# Patient Record
Sex: MALE | Race: WHITE
[De-identification: names, ages, dates, MRNs, and addresses within clinical notes are randomized per-mention and may not be internally consistent; named-entity substitution may affect disease eponyms.]

---

## 2017-02-08 ENCOUNTER — HOSPITAL ENCOUNTER (EMERGENCY)
Dept: HOSPITAL 25 - UCCORT | Age: 62
Discharge: LEFT BEFORE DISCHARGE | End: 2017-02-08
Payer: COMMERCIAL

## 2017-02-08 DIAGNOSIS — R05: Primary | ICD-10-CM

## 2017-02-08 DIAGNOSIS — Z53.21: ICD-10-CM

## 2017-02-08 PROCEDURE — 99211 OFF/OP EST MAY X REQ PHY/QHP: CPT

## 2017-02-08 PROCEDURE — G0463 HOSPITAL OUTPT CLINIC VISIT: HCPCS

## 2017-02-09 ENCOUNTER — HOSPITAL ENCOUNTER (EMERGENCY)
Dept: HOSPITAL 25 - UCCORT | Age: 62
Discharge: HOME | End: 2017-02-09
Payer: COMMERCIAL

## 2017-02-09 VITALS — DIASTOLIC BLOOD PRESSURE: 81 MMHG | SYSTOLIC BLOOD PRESSURE: 132 MMHG

## 2017-02-09 DIAGNOSIS — J44.1: Primary | ICD-10-CM

## 2017-02-09 DIAGNOSIS — Z87.891: ICD-10-CM

## 2017-02-09 PROCEDURE — G0463 HOSPITAL OUTPT CLINIC VISIT: HCPCS

## 2017-02-09 PROCEDURE — 99212 OFFICE O/P EST SF 10 MIN: CPT

## 2017-02-09 NOTE — UC
Throat Pain/Nasal Ranjit HPI





- HPI Summary


HPI Summary: 





complaint of cough , nasal congestion that started 6 days ago


 cough with some white sputum occasionally


entire body and muscles aches


 N/V on the first day of illness which has resolved


sometimes his ears are painful, sore throat, 


denies headaches, 


denies fever abut has had some chills


taking alkaseptzer and mucinex and cough dropswithout relief





- History of Current Complaint


Chief Complaint: UCRespiratory


Stated Complaint: CONGESTION/COUGH


Time Seen by Provider: 02/09/17 10:20


Hx Obtained From: Patient





- Allergies/Home Medications


Allergies/Adverse Reactions: 


 Allergies











Allergy/AdvReac Type Severity Reaction Status Date / Time


 


No Known Allergies Allergy   Verified 02/09/17 09:27











Home Medications: 


 Home Medications





Vitamin C-Cholecalciferol-Deloris [Vitamin C & D3/Deloris Hips] 1 cap PO DAILY 02/09/ 17 [History Confirmed 02/09/17]











PMH/Surg Hx/FS Hx/Imm Hx


Previously Healthy: Yes


Cardiovascular History Of: Reports: Cardiac Disorders - CAD, Hypertension


Respiratory History Of: Reports: COPD





- Surgical History


Surgical History: Yes


Surgery Procedure, Year, and Place: R TKR--8/2016.  BACK SX.  APPY.  CARDIAC 

STENT





- Family History


Known Family History: Positive: Cardiac Disease, Hypertension


   Negative: Diabetes





- Social History


Occupation: Employed Full-time


Lives: With Family


Alcohol Use: Weekly


Substance Use Type: None


Smoking Status (MU): Former Smoker


Have You Smoked in the Last Year: No


When Did the Patient Quit Smoking/Using Tobacco: 2006





- Immunization History


Most Recent Influenza Vaccination: Not the 2016/2017 Season





Review of Systems


Constitutional: Fever, Chills, Fatigue


Skin: Negative


Eyes: Negative


ENT: Sore Throat, Nasal Discharge


Respiratory: Cough


Cardiovascular: Negative


Gastrointestinal: Negative


Genitourinary: Negative


Motor: Negative


Neurovascular: Negative


Musculoskeletal: Myalgia


Neurological: Negative


Psychological: Negative


All Other Systems Reviewed And Are Negative: Yes





Physical Exam


Triage Information Reviewed: Yes


Appearance: No Pain Distress, Well-Nourished


Vital Signs: 


 Initial Vital Signs











Temp  97.6 F   02/09/17 09:25


 


Pulse  92   02/09/17 09:25


 


Resp  18   02/09/17 09:25


 


BP  132/81   02/09/17 09:25


 


Pulse Ox  96   02/09/17 09:25











Vital Signs Reviewed: Yes


Eyes: Positive: Conjunctiva Clear


ENT: Positive: Pharyngeal erythema, Nasal congestion, Nasal drainage, TMs normal


Neck: Positive: No Lymphadenopathy


Respiratory: Positive: No respiratory distress, No accessory muscle use, 

Wheezing - wheezing throughout ll fields


Cardiovascular: Positive: RRR, No Murmur, Pulses Normal


Abdomen Description: Positive: Nontender, Soft


Bowel Sounds: Positive: Present


Musculoskeletal: Positive: No Edema


Neurological: Positive: Alert


Psychological Exam: Normal


Skin Exam: Normal





Re-Evaluation





- Re-Evaluation


  ** First Eval


Re-Evaluation Time: 10:55


Change: Improved


Comment: less wheezing and air movement





Throat Pain/Nasal Course/Dx





- Course


Course Of Treatment: exam completed.  duoneb treatment given d/t wheezing 

throughout.  will treat for COPD exacerabation following viral illness/most 

likely influenza- followup with PCP





- Differential Dx/Diagnosis


Differential Diagnosis/HQI/PQRI: Influenza, URI, Other - COPD excerbation


Provider Diagnoses: COPD excerbation





Discharge





- Discharge Plan


Condition: Stable


Disposition: HOME


Prescriptions: 


Albuterol HFA INHALER* [Ventolin HFA Inhaler*] 2 puff INH Q4H PRN #1 mdi


 PRN Reason: Wheezing


Azithromycin TAB* [Zithromax TAB (Z-KATHRYN) 250 mg #6 tabs] 2 tab PO .TODAY, THEN 

1 DAILY #1 kathryn


predniSONE TAB* [Deltasone TAB*] 50 mg PO DAILY #5 tab


Patient Education Materials:  COPD (Chronic Obstructive Pulmonary Disease) (ED)


Forms:  *Work Release


Referrals: 


Tresa Eduardo PA [Primary Care Provider] - 


Additional Instructions: 


Please take antibiotic as directed


Use your albuterol inhaler every 4-6 hours when needed for wheezing, shortness 

of breath or uncontrolled coughing.


 Increase fluids and rest


Take acetaminophen or ibuprofen for fever or pain


Please review your discharge instructions. 


If your symptoms do not improve please call your primary care provider or 

return to urgent care.

## 2018-08-26 ENCOUNTER — HOSPITAL ENCOUNTER (EMERGENCY)
Dept: HOSPITAL 25 - UCCORT | Age: 63
Discharge: HOME | End: 2018-08-26
Payer: COMMERCIAL

## 2018-08-26 VITALS — SYSTOLIC BLOOD PRESSURE: 136 MMHG | DIASTOLIC BLOOD PRESSURE: 79 MMHG

## 2018-08-26 DIAGNOSIS — S61.213A: Primary | ICD-10-CM
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
## 2018-08-26 DIAGNOSIS — I48.91: ICD-10-CM
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
## 2018-08-26 DIAGNOSIS — Z87.891: ICD-10-CM

## 2018-08-26 DIAGNOSIS — Z79.82: ICD-10-CM

## 2018-08-26 PROCEDURE — 99212 OFFICE O/P EST SF 10 MIN: CPT

## 2018-08-26 PROCEDURE — 12001 RPR S/N/AX/GEN/TRNK 2.5CM/<: CPT

## 2018-08-26 PROCEDURE — G0463 HOSPITAL OUTPT CLINIC VISIT: HCPCS

## 2018-08-26 NOTE — UC
Laceration HPI





- HPI Summary


HPI Summary: 





Patient states he was cutting a cabbage about an hour ago prior to arrival.  

Knife slipped and he cut his third finger on left hand.  He is currently taking 

aspirin and eliquis due to atrial fibrillation, which was finally controlled in 

June after cardiac ablation.  The Bleeding was effectively controlled with 

compressive bandage.  Patient denies any pain at the moment.





- History Of Current Complaint


Chief Complaint: UCLaceration


Stated Complaint: FINGER LACERATION


Time Seen by Provider: 08/26/18 19:32


Hx Obtained From: Patient


Laceration Location: Finger


Mechanism Of Injury: Sharp Trauma


Onset/Duration: Sudden Onset, Lasting Hours


Severity: Mild


Pain Intensity: 2


Aggravating Factors: Nothing





- Allergies/Home Medications


Allergies/Adverse Reactions: 


 Allergies











Allergy/AdvReac Type Severity Reaction Status Date / Time


 


No Known Allergies Allergy   Verified 08/26/18 19:31











Home Medications: 


 Home Medications





Apixaban* [Eliquis*] 5 mg PO DAILY 08/26/18 [History Confirmed 08/26/18]


Aspirin 81 mg CHEW TAB* 81 mg DAILY 08/26/18 [History Confirmed 08/26/18]


Diltiazem CD CAP* [Cardizem CD CAP*] 120 mg PO DAILY 08/26/18 [History 

Confirmed 08/26/18]


Furosemide TAB* [Lasix TAB*] 40 mg PO DAILY 08/26/18 [History Confirmed 08/26/18

]


Metoprolol Succinate [Toprol Xl] 100 mg PO DAILY 08/26/18 [History Confirmed 08/ 26/18]


Rosuvastatin Calcium 10 mg PO DAILY 08/26/18 [History Confirmed 08/26/18]


Spironolactone TAB* [Aldactone TAB*] 100 mg PO DAILY 08/26/18 [History 

Confirmed 08/26/18]











PMH/Surg Hx/FS Hx/Imm Hx


Previously Healthy: Yes


Endocrine History: Dyslipidemia


Cardiovascular History: Cardiac Disease, Hypertension, Atrial Fibrillation


GI/ History: Gastroesophageal Reflux





- Surgical History


Surgical History: Yes


Surgery Procedure, Year, and Place: R TKR--8/2016.  BACK SX.  APPY.  CARDIAC 

STENT.  Cardiac ablation 6/2018





- Family History


Known Family History: Positive: Cardiac Disease, Hypertension


   Negative: Diabetes





- Social History


Alcohol Use: Weekly


Substance Use Type: None


Smoking Status (MU): Former Smoker


Have You Smoked in the Last Year: No


When Did the Patient Quit Smoking/Using Tobacco: 2006





- Immunization History


Most Recent Influenza Vaccination: Not the 2016/2017 Season





Review of Systems


Constitutional: Negative


Skin: Other - laceration


All Other Systems Reviewed And Are Negative: Yes





Physical Exam


Triage Information Reviewed: Yes


Appearance: Well-Appearing, No Pain Distress, Well-Nourished


Vital Signs: 


 Initial Vital Signs











Temp  97.8 F   08/26/18 19:24


 


Pulse  55   08/26/18 19:24


 


Resp  18   08/26/18 19:24


 


BP  136/79   08/26/18 19:24


 


Pulse Ox  98   08/26/18 19:24











Vital Signs Reviewed: Yes


Eyes: Positive: Conjunctiva Clear


ENT: Positive: Hearing grossly normal


Respiratory: Positive: Chest non-tender, Lungs clear, Normal breath sounds, No 

respiratory distress


Cardiovascular: Positive: RRR, No Murmur, Pulses Normal, Brisk Capillary Refill


Musculoskeletal: Positive: Strength Intact, ROM Intact, No Edema, Other: - 

flexion and extension of fingers preserved. Capillary refill brisk, ulnar and 

radial pulses present.


Skin Exam: Other - laceration on the third finger of left hand 1.5 cm in length 

on the ventral aspect of distal phalanx.  No active bleeding





Laceration Repair





- Laceration Repair


  ** 1


Description: Linear


Laceration Size After Repair: Length (cm) - 2cm


Modified For Repair: No


Anesthesia Used: 1.0% Lido


Cleansing Completed Via Routine Prep: Yes


Irrigation With Pressure Irrigation Device: No


Closure Material: Sutures


Closure Method: Single Layer


Suture Of: Skin


Suture Type: Prolene





Laceration Course/Dx





- Course/Dx


Course Of Treatment: Laceration was 2cm in length, was repaired with 4 sutures 

ethicon 6.  Patient tolerated procedure well. Dermabond applied after sutures. 

Instructed to come back to urgent care for suture removal in one week.





- Differential Dx - Laceration/Wound


Provider Diagnoses: Laceration third finger of left hand





Discharge





- Sign-Out/Discharge


Documenting (check all that apply): Patient Departure


All imaging exams completed and their final reports reviewed: No Studies





- Discharge Plan


Condition: Stable


Disposition: HOME


Patient Education Materials:  Laceration (ED), Care For Your Stitches (DC)


Referrals: 


Juan C Davalos MD [Primary Care Provider] - 


Additional Instructions: 


please return to urgent care in 1 week for wound checkup and suture removal





- Billing Disposition and Condition


Condition: STABLE


Disposition: Home





Images


Hands: 


  __________________________














  __________________________





 1 - linear laceration 2cm in length

## 2020-02-19 ENCOUNTER — HOSPITAL ENCOUNTER (EMERGENCY)
Dept: HOSPITAL 25 - UCCORT | Age: 65
Discharge: HOME | End: 2020-02-19
Payer: COMMERCIAL

## 2020-02-19 VITALS — SYSTOLIC BLOOD PRESSURE: 125 MMHG | DIASTOLIC BLOOD PRESSURE: 71 MMHG

## 2020-02-19 DIAGNOSIS — J44.9: ICD-10-CM

## 2020-02-19 DIAGNOSIS — I48.91: ICD-10-CM
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
## 2020-02-19 DIAGNOSIS — Z87.891: ICD-10-CM

## 2020-02-19 DIAGNOSIS — S62.102A: Primary | ICD-10-CM

## 2020-02-19 DIAGNOSIS — I10: ICD-10-CM

## 2020-02-19 DIAGNOSIS — Z79.899: ICD-10-CM
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
## 2020-02-19 DIAGNOSIS — Z79.82: ICD-10-CM

## 2020-02-19 PROCEDURE — G0463 HOSPITAL OUTPT CLINIC VISIT: HCPCS

## 2020-02-19 PROCEDURE — 99212 OFFICE O/P EST SF 10 MIN: CPT

## 2020-02-19 NOTE — UC
Upper Extremity HPI





- HPI Summary


HPI Summary: 





65 yo with pain in left wrist and hand since a fall earlier today. He fell onto 

his buttocks when he slipped, cannot recall exactly how he fell. Persistent 

pain and swelling. He is mostly tender over the distal ulna. 


He takes apixaban for atrial fibrillation. 





- History of Current Complaint


Chief Complaint: UCGeneralIllness


Stated Complaint: LT WRIST INJURY


Time Seen by Provider: 20 15:59


Hx Obtained From: Patient, Family/Caretaker - here with his wife.


Onset/Duration: Sudden Onset


Severity Initially: Moderate


Severity Currently: Moderate


Pain Intensity: 8


Location Of Pain: Is Discrete @ - left wrist, forearm


Character: Aching


Aggravating Factor(s): Movement


Alleviating Factor(s): Compression, Ice


Associated Signs And Symptoms: Positive: Negative





- Risk Factors


Non-Orthopedic Risk Factor: Negative


DVT Risk Factors: Negative


Septic Arthritis Risk Factor: Negative





- Allergies/Home Medications


Allergies/Adverse Reactions: 


 Allergies











Allergy/AdvReac Type Severity Reaction Status Date / Time


 


No Known Allergies Allergy   Verified 20 15:46











Home Medications: 


 Home Medications





Aspirin [Aspirin Adult Low Dose 81 MG] 81 mg PO DAILY 16 [History 

Confirmed 20]


Famotidine TAB* [Pepcid 20 MG TAB*] 40 mg PO DAILY 16 [History Confirmed 

20]


Apixaban* [Eliquis*] 5 mg PO BID 18 [History Confirmed 20]


Diltiazem CD CAP* [Cardizem CD CAP*] 240 mg PO DAILY 18 [History 

Confirmed 20]


Furosemide TAB* [Lasix TAB*] 40 mg PO DAILY 18 [History Confirmed 20

]


Metoprolol Succinate [Toprol Xl] 50 mg PO QPM 18 [History Confirmed ]


Rosuvastatin Calcium 10 mg PO QPM 18 [History Confirmed 20]


Spironolactone TAB* [Aldactone TAB*] 100 mg PO DAILY 18 [History 

Confirmed 20]


Umeclidin/Vilant 62.5 MDI(NF) [ANORO 62.5/25 Ellipta DEVICE (NF)] 1 inh QAM  [History Confirmed 20]











PMH/Surg Hx/FS Hx/Imm Hx


Cardiovascular History: Cardiac Disease, Hypertension, Atrial Fibrillation


Respiratory History: COPD





- Surgical History


Surgical History: Yes


Surgery Procedure, Year, and Place: R TKR--2016.  BACK SX.  APPY.  CARDIAC 

STENT.  Cardiac ablation 2018.  RIGHT shoulder 2019.  Cardiac ablation 

scheduled for 2020





- Family History


Known Family History: Positive: Cardiac Disease, Hypertension


   Negative: Diabetes





- Social History


Lives: With Family


Alcohol Use: Rare


Substance Use Type: None


Smoking Status (MU): Former Smoker


Have You Smoked in the Last Year: No


When Did the Patient Quit Smoking/Using Tobacco: 





- Immunization History


Most Recent Influenza Vaccination: Not the / Season


Most Recent Tetanus Shot: 2019





Review of Systems


All Other Systems Reviewed And Are Negative: Yes


Constitutional: Positive: Negative


Skin: Positive: Negative


Eyes: Positive: Negative


ENT: Positive: Negative


Respiratory: Positive: Negative


Cardiovascular: Positive: Other - currently in sinus rhythm.


Gastrointestinal: Positive: Negative


Genitourinary: Positive: Negative


Motor: Positive: Decreased ROM


Neurovascular: Positive: Negative


Musculoskeletal: Positive: Arthralgia


Neurological/Mental Status: Positive: Negative


Psychological: Positive: Negative


Is Patient Immunocompromised?: No





Physical Exam


Triage Information Reviewed: Yes


Appearance: Pain Distress - mild to moderate


Vital Signs: 


 Initial Vital Signs











Temp  97.4 F   20 15:39


 


Pulse  51   20 15:39


 


Resp  16   20 15:39


 


BP  125/71   20 15:39


 


Pulse Ox  98   20 15:39











Vital Signs Reviewed: Yes


ENT: Positive: Pharynx normal


Respiratory: Positive: Lungs clear, Normal breath sounds


Cardiovascular: Positive: RRR, No Murmur, Bradycardia


Musculoskeletal Exam: Other - TTP distal ulna, swelling left wrist and hand 

with decreased range of motion and tenderness throughout.


Neurological: Positive: Alert, Muscle Tone Normal


Psychological Exam: Normal


Skin Exam: Other - small agrasion base of second digit. Hand is warm and well 

perfused.





Diagnostics





- Radiology


  ** No standard instances


Radiology Interpretation Completed By: Radiologist - Patient Name:        

TANJA WINTER                                                              

Medical Record#: [id number] Ordering Physician: Sarah Rg MD               

                                                Acct.#: [id number] :             Age: 64   Sex: M                                               

         Location: URGENT CARE [facility] Exam Date: 20 1603             

                                                              ADM Status: REG 

ER Order Information:                        WRIST LEFT 3+ VWS Accession Number

:                         [id number] CPT:                                      

09485 INDICATION:  Left wrist injury. TECHNIQUE: 3 views of the left wrist were 

obtained.  FINDINGS:  There is diffuse soft tissue swelling most prominent 

dorsal to the carpal bones. On the lateral image note is made of a fracture 

fragment dorsal to the carpal bones possibly arising from the triquetrum bone 

although nonspecific.  There is a lucent lesion in the proximal capitate bone 

measuring 5 mm in size with a faint sclerotic margin suggestive of a 

subchondral cyst.  IMPRESSION:  THERE IS A FRACTURE FRAGMENT DORSAL TO THE 

CARPAL BONES.   ____________________________________________________________ <

Electronically signed by Gucci Frost MD in OV>  20 1621 Dictated By: 

Gucci Frost MD Dictated Date/Time: 20 1619 Transcribed Date/Time:  1619 Copy to:    CC:Juan C Davalos MD; Sarah Rg MD Imaging - [facility]                               Imaging - [place] [facility]  [address]           

                          [address] [place] [address] ph ([phone])                                   ph ([phone])    

                                          ph ([phone])               This 

report is only to be considered final once signed by the Provider(s) as 

displayed in the "<Electronically Signed by >" field (s). Absence of a  

signature indicates the report is in a draft status and still needs to be 

finalized. In the event this document was created by someone other than the  

signing Provider, the individual initiating the document will be listed in the 

"Entered by:" or "Dictated by:" fields.                                        

                       1 of 1





Upper Extremity Course/Dx





- Course


Course Of Treatment: 





splint to left wrist with orthopedic follow up; discussed possible fracture in 

the carpal bones. He has done several surgeries with [facility] and will follow up 

there. 





- Differential Dx/Diagnosis


Differential Diagnosis/HQI/PQRI: Fracture (Closed), Strain, Sprain


Provider Diagnosis: 


 Fracture, carpal bone closed








Discharge ED





- Sign-Out/Discharge


Documenting (check all that apply): Patient Departure


All imaging exams completed and their final reports reviewed: Yes





- Discharge Plan


Condition: Stable


Disposition: HOME


Patient Education Materials:  Wrist Fracture in Adults (ED)


Referrals: 


Juan C Davalos MD [Primary Care Provider] - 


Additional Instructions: 


The xray shows a possible fracture in one of the carpal bones of the wrist. 


Keep the splint applied, and call [facility] for an 

evaluation within the next 2 ro 3 days. 


PLEASE SCHEDULE A VISIT WITH [facility] FOR EVALUATION--[phone]


apply ice to the wrist and use acetaminophen 650mg every 6 hours as needed for 

control of pain. 





- Billing Disposition and Condition


Condition: STABLE


Disposition: Home